# Patient Record
Sex: MALE | Race: WHITE | NOT HISPANIC OR LATINO | Employment: FULL TIME | URBAN - METROPOLITAN AREA
[De-identification: names, ages, dates, MRNs, and addresses within clinical notes are randomized per-mention and may not be internally consistent; named-entity substitution may affect disease eponyms.]

---
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
## 2018-06-26 ENCOUNTER — HOSPITAL ENCOUNTER (EMERGENCY)
Dept: HOSPITAL 8 - ED | Age: 18
Discharge: HOME | End: 2018-06-26
Payer: COMMERCIAL

## 2018-06-26 VITALS — SYSTOLIC BLOOD PRESSURE: 108 MMHG | DIASTOLIC BLOOD PRESSURE: 76 MMHG

## 2018-06-26 VITALS — WEIGHT: 132.94 LBS | HEIGHT: 71 IN | BODY MASS INDEX: 18.61 KG/M2

## 2018-06-26 DIAGNOSIS — J93.83: Primary | ICD-10-CM

## 2018-06-26 DIAGNOSIS — R07.89: ICD-10-CM

## 2018-06-26 PROCEDURE — 99284 EMERGENCY DEPT VISIT MOD MDM: CPT

## 2018-06-26 PROCEDURE — 71046 X-RAY EXAM CHEST 2 VIEWS: CPT

## 2018-06-26 PROCEDURE — 93005 ELECTROCARDIOGRAM TRACING: CPT

## 2018-06-26 PROCEDURE — 96374 THER/PROPH/DIAG INJ IV PUSH: CPT

## 2018-06-26 PROCEDURE — 96375 TX/PRO/DX INJ NEW DRUG ADDON: CPT

## 2018-06-29 ENCOUNTER — HOSPITAL ENCOUNTER (EMERGENCY)
Dept: HOSPITAL 8 - ED | Age: 18
Discharge: HOME | End: 2018-06-29
Payer: COMMERCIAL

## 2018-06-29 VITALS — HEIGHT: 71 IN | BODY MASS INDEX: 18.64 KG/M2 | WEIGHT: 133.16 LBS

## 2018-06-29 VITALS — SYSTOLIC BLOOD PRESSURE: 107 MMHG | DIASTOLIC BLOOD PRESSURE: 65 MMHG

## 2018-06-29 DIAGNOSIS — J93.83: Primary | ICD-10-CM

## 2018-06-29 PROCEDURE — 71046 X-RAY EXAM CHEST 2 VIEWS: CPT

## 2018-06-29 PROCEDURE — 99284 EMERGENCY DEPT VISIT MOD MDM: CPT

## 2018-07-24 ENCOUNTER — NON-PROVIDER VISIT (OUTPATIENT)
Dept: OCCUPATIONAL MEDICINE | Facility: CLINIC | Age: 18
End: 2018-07-24

## 2018-07-24 DIAGNOSIS — Z02.89 VISIT FOR OCCUPATIONAL HEALTH EXAMINATION: Primary | ICD-10-CM

## 2018-07-24 LAB
AMP AMPHETAMINE: NORMAL
BREATH ALCOHOL COMMENT: NORMAL
COC COCAINE: NORMAL
INT CON NEG: NORMAL
INT CON POS: NORMAL
MET METHAMPHETAMINES: NORMAL
OPI OPIATES: NORMAL
PCP PHENCYCLIDINE: NORMAL
POC BREATHALIZER: 0 PERCENT (ref 0–0.01)
POC DRUG COMMENT 753798-OCCUPATIONAL HEALTH: NORMAL
THC: NORMAL

## 2018-07-24 PROCEDURE — 82075 ASSAY OF BREATH ETHANOL: CPT | Performed by: PREVENTIVE MEDICINE

## 2018-07-24 PROCEDURE — 80305 DRUG TEST PRSMV DIR OPT OBS: CPT | Performed by: PREVENTIVE MEDICINE

## 2018-07-31 ENCOUNTER — NON-PROVIDER VISIT (OUTPATIENT)
Dept: OCCUPATIONAL MEDICINE | Facility: CLINIC | Age: 18
End: 2018-07-31
Payer: COMMERCIAL

## 2018-07-31 PROCEDURE — 8911 PR MRO FEE: Performed by: INTERNAL MEDICINE

## 2018-08-10 ENCOUNTER — HOSPITAL ENCOUNTER (OUTPATIENT)
Dept: HOSPITAL 8 - STAR | Age: 18
Discharge: HOME | End: 2018-08-10
Attending: SURGERY
Payer: COMMERCIAL

## 2018-08-10 DIAGNOSIS — Z02.9: Primary | ICD-10-CM

## 2018-08-15 ENCOUNTER — HOSPITAL ENCOUNTER (INPATIENT)
Dept: HOSPITAL 8 - ORIP | Age: 18
LOS: 1 days | Discharge: HOME | DRG: 165 | End: 2018-08-16
Attending: SURGERY | Admitting: SURGERY
Payer: COMMERCIAL

## 2018-08-15 VITALS — DIASTOLIC BLOOD PRESSURE: 56 MMHG | SYSTOLIC BLOOD PRESSURE: 94 MMHG

## 2018-08-15 VITALS — BODY MASS INDEX: 17.5 KG/M2 | WEIGHT: 125 LBS | HEIGHT: 71 IN

## 2018-08-15 DIAGNOSIS — J93.83: Primary | ICD-10-CM

## 2018-08-15 PROCEDURE — 88309 TISSUE EXAM BY PATHOLOGIST: CPT

## 2018-08-15 PROCEDURE — 36415 COLL VENOUS BLD VENIPUNCTURE: CPT

## 2018-08-15 PROCEDURE — 80048 BASIC METABOLIC PNL TOTAL CA: CPT

## 2018-08-15 PROCEDURE — S0028 INJECTION, FAMOTIDINE, 20 MG: HCPCS

## 2018-08-15 PROCEDURE — 86850 RBC ANTIBODY SCREEN: CPT

## 2018-08-15 PROCEDURE — 71045 X-RAY EXAM CHEST 1 VIEW: CPT

## 2018-08-15 PROCEDURE — C1729 CATH, DRAINAGE: HCPCS

## 2018-08-15 PROCEDURE — 86900 BLOOD TYPING SEROLOGIC ABO: CPT

## 2018-08-15 PROCEDURE — 0BBK4ZZ EXCISION OF RIGHT LUNG, PERCUTANEOUS ENDOSCOPIC APPROACH: ICD-10-PCS | Performed by: SURGERY

## 2018-08-15 PROCEDURE — 85025 COMPLETE CBC W/AUTO DIFF WBC: CPT

## 2018-08-15 RX ADMIN — HYDROMORPHONE HYDROCHLORIDE PRN MG: 1 INJECTION, SOLUTION INTRAMUSCULAR; INTRAVENOUS; SUBCUTANEOUS at 14:05

## 2018-08-15 RX ADMIN — OXYCODONE HYDROCHLORIDE PRN MG: 5 SOLUTION ORAL at 13:00

## 2018-08-15 RX ADMIN — HYDROMORPHONE HYDROCHLORIDE PRN MG: 1 INJECTION, SOLUTION INTRAMUSCULAR; INTRAVENOUS; SUBCUTANEOUS at 13:37

## 2018-08-15 RX ADMIN — KETOROLAC TROMETHAMINE SCH MG: 30 INJECTION, SOLUTION INTRAMUSCULAR at 16:30

## 2018-08-15 RX ADMIN — OXYCODONE HYDROCHLORIDE PRN MG: 5 SOLUTION ORAL at 12:57

## 2018-08-15 RX ADMIN — KETOROLAC TROMETHAMINE SCH MG: 30 INJECTION, SOLUTION INTRAMUSCULAR at 23:04

## 2018-08-15 RX ADMIN — FENTANYL CITRATE PRN MCG: 50 INJECTION INTRAMUSCULAR; INTRAVENOUS at 13:09

## 2018-08-15 RX ADMIN — FENTANYL CITRATE PRN MCG: 50 INJECTION INTRAMUSCULAR; INTRAVENOUS at 12:57

## 2018-08-15 RX ADMIN — FAMOTIDINE SCH MG: 10 INJECTION INTRAVENOUS at 15:16

## 2018-08-15 RX ADMIN — HYDROMORPHONE HYDROCHLORIDE PRN MG: 1 INJECTION, SOLUTION INTRAMUSCULAR; INTRAVENOUS; SUBCUTANEOUS at 13:49

## 2018-08-15 RX ADMIN — FENTANYL CITRATE PRN MCG: 50 INJECTION INTRAMUSCULAR; INTRAVENOUS at 14:20

## 2018-08-15 RX ADMIN — FAMOTIDINE SCH MG: 20 TABLET, FILM COATED ORAL at 15:16

## 2018-08-16 VITALS — DIASTOLIC BLOOD PRESSURE: 50 MMHG | SYSTOLIC BLOOD PRESSURE: 95 MMHG

## 2018-08-16 VITALS — SYSTOLIC BLOOD PRESSURE: 98 MMHG | DIASTOLIC BLOOD PRESSURE: 55 MMHG

## 2018-08-16 VITALS — SYSTOLIC BLOOD PRESSURE: 108 MMHG | DIASTOLIC BLOOD PRESSURE: 59 MMHG

## 2018-08-16 LAB
ANION GAP SERPL CALC-SCNC: 5 MMOL/L (ref 5–15)
BASOPHILS # BLD AUTO: 0.02 X10^3/UL (ref 0–0.3)
BASOPHILS NFR BLD AUTO: 0 % (ref 0–1)
CALCIUM SERPL-MCNC: 8.4 MG/DL (ref 8.5–10.1)
CHLORIDE SERPL-SCNC: 106 MMOL/L (ref 98–107)
CREAT SERPL-MCNC: 1.07 MG/DL (ref 0.7–1.3)
EOSINOPHIL # BLD AUTO: 0.06 X10^3/UL (ref 0–0.8)
EOSINOPHIL NFR BLD AUTO: 1 % (ref 1–7)
ERYTHROCYTE [DISTWIDTH] IN BLOOD BY AUTOMATED COUNT: 12.9 % (ref 9.4–14.8)
LYMPHOCYTES # BLD AUTO: 2.2 X10^3/UL (ref 1–6.1)
LYMPHOCYTES NFR BLD AUTO: 20 % (ref 22–44)
MCH RBC QN AUTO: 32.4 PG (ref 27.5–34.5)
MCHC RBC AUTO-ENTMCNC: 34.1 G/DL (ref 33.2–36.2)
MCV RBC AUTO: 94.8 FL (ref 81–97)
MD: NO
MONOCYTES # BLD AUTO: 0.7 X10^3/UL (ref 0–1.4)
MONOCYTES NFR BLD AUTO: 6 % (ref 2–9)
NEUTROPHILS # BLD AUTO: 7.94 X10^3/UL (ref 1.8–8)
NEUTROPHILS NFR BLD AUTO: 73 % (ref 42–75)
PLATELET # BLD AUTO: 311 X10^3/UL (ref 130–400)
PMV BLD AUTO: 7.7 FL (ref 7.4–10.4)
RBC # BLD AUTO: 4.18 X10^6/UL (ref 4.38–5.82)

## 2018-08-16 RX ADMIN — KETOROLAC TROMETHAMINE SCH MG: 30 INJECTION, SOLUTION INTRAMUSCULAR at 05:50

## 2018-08-16 RX ADMIN — FAMOTIDINE SCH MG: 20 TABLET, FILM COATED ORAL at 00:26

## 2018-08-16 RX ADMIN — FAMOTIDINE SCH MG: 10 INJECTION INTRAVENOUS at 00:27

## 2018-08-22 ENCOUNTER — NON-PROVIDER VISIT (OUTPATIENT)
Dept: OCCUPATIONAL MEDICINE | Facility: CLINIC | Age: 18
End: 2018-08-22

## 2018-08-22 DIAGNOSIS — Z02.1 PRE-EMPLOYMENT DRUG SCREENING: ICD-10-CM

## 2018-08-22 LAB
AMP AMPHETAMINE: NORMAL
COC COCAINE: NORMAL
INT CON NEG: NORMAL
INT CON POS: NORMAL
MET METHAMPHETAMINES: NORMAL
OPI OPIATES: NORMAL
PCP PHENCYCLIDINE: NORMAL
POC DRUG COMMENT 753798-OCCUPATIONAL HEALTH: NORMAL
THC: NORMAL

## 2018-08-22 PROCEDURE — 80305 DRUG TEST PRSMV DIR OPT OBS: CPT | Performed by: PREVENTIVE MEDICINE

## 2020-02-20 ENCOUNTER — HOSPITAL ENCOUNTER (OUTPATIENT)
Dept: LAB | Facility: MEDICAL CENTER | Age: 20
End: 2020-02-20
Attending: NURSE PRACTITIONER
Payer: COMMERCIAL

## 2020-02-20 ENCOUNTER — APPOINTMENT (OUTPATIENT)
Dept: RADIOLOGY | Facility: IMAGING CENTER | Age: 20
End: 2020-02-20
Attending: NURSE PRACTITIONER
Payer: COMMERCIAL

## 2020-02-20 ENCOUNTER — OFFICE VISIT (OUTPATIENT)
Dept: URGENT CARE | Facility: CLINIC | Age: 20
End: 2020-02-20
Payer: COMMERCIAL

## 2020-02-20 VITALS
BODY MASS INDEX: 19.6 KG/M2 | DIASTOLIC BLOOD PRESSURE: 78 MMHG | OXYGEN SATURATION: 98 % | WEIGHT: 140 LBS | HEART RATE: 78 BPM | HEIGHT: 71 IN | TEMPERATURE: 98.7 F | SYSTOLIC BLOOD PRESSURE: 110 MMHG

## 2020-02-20 DIAGNOSIS — R06.02 SHORTNESS OF BREATH: ICD-10-CM

## 2020-02-20 DIAGNOSIS — J22 LOWER RESPIRATORY INFECTION (E.G., BRONCHITIS, PNEUMONIA, PNEUMONITIS, PULMONITIS): ICD-10-CM

## 2020-02-20 LAB — D DIMER PPP IA.FEU-MCNC: 0.48 UG/ML (FEU) (ref 0–0.5)

## 2020-02-20 PROCEDURE — 99214 OFFICE O/P EST MOD 30 MIN: CPT | Performed by: NURSE PRACTITIONER

## 2020-02-20 PROCEDURE — 85379 FIBRIN DEGRADATION QUANT: CPT

## 2020-02-20 PROCEDURE — 71046 X-RAY EXAM CHEST 2 VIEWS: CPT | Mod: TC | Performed by: NURSE PRACTITIONER

## 2020-02-20 PROCEDURE — 36415 COLL VENOUS BLD VENIPUNCTURE: CPT

## 2020-02-20 RX ORDER — BENZONATATE 100 MG/1
100 CAPSULE ORAL 3 TIMES DAILY PRN
Qty: 60 CAP | Refills: 0 | Status: SHIPPED | OUTPATIENT
Start: 2020-02-20 | End: 2023-10-05

## 2020-02-20 SDOH — HEALTH STABILITY: MENTAL HEALTH: HOW OFTEN DO YOU HAVE A DRINK CONTAINING ALCOHOL?: 2-3 TIMES A WEEK

## 2020-02-20 ASSESSMENT — ENCOUNTER SYMPTOMS
WHEEZING: 0
SHORTNESS OF BREATH: 1

## 2020-02-20 NOTE — PATIENT INSTRUCTIONS
Cough, Adult  Coughing is a reflex that clears your throat and your airways. Coughing helps to heal and protect your lungs. It is normal to cough occasionally, but a cough that happens with other symptoms or lasts a long time may be a sign of a condition that needs treatment. A cough may last only 2-3 weeks (acute), or it may last longer than 8 weeks (chronic).  What are the causes?  Coughing is commonly caused by:  · Breathing in substances that irritate your lungs.  · A viral or bacterial respiratory infection.  · Allergies.  · Asthma.  · Postnasal drip.  · Smoking.  · Acid backing up from the stomach into the esophagus (gastroesophageal reflux).  · Certain medicines.  · Chronic lung problems, including COPD (or rarely, lung cancer).  · Other medical conditions such as heart failure.  Follow these instructions at home:  Pay attention to any changes in your symptoms. Take these actions to help with your discomfort:  · Take medicines only as told by your health care provider.  ¨ If you were prescribed an antibiotic medicine, take it as told by your health care provider. Do not stop taking the antibiotic even if you start to feel better.  ¨ Talk with your health care provider before you take a cough suppressant medicine.  · Drink enough fluid to keep your urine clear or pale yellow.  · If the air is dry, use a cold steam vaporizer or humidifier in your bedroom or your home to help loosen secretions.  · Avoid anything that causes you to cough at work or at home.  · If your cough is worse at night, try sleeping in a semi-upright position.  · Avoid cigarette smoke. If you smoke, quit smoking. If you need help quitting, ask your health care provider.  · Avoid caffeine.  · Avoid alcohol.  · Rest as needed.  Contact a health care provider if:  · You have new symptoms.  · You cough up pus.  · Your cough does not get better after 2-3 weeks, or your cough gets worse.  · You cannot control your cough with suppressant medicines  and you are losing sleep.  · You develop pain that is getting worse or pain that is not controlled with pain medicines.  · You have a fever.  · You have unexplained weight loss.  · You have night sweats.  Get help right away if:  · You cough up blood.  · You have difficulty breathing.  · Your heartbeat is very fast.  This information is not intended to replace advice given to you by your health care provider. Make sure you discuss any questions you have with your health care provider.  Document Released: 06/15/2012 Document Revised: 05/25/2017 Document Reviewed: 02/24/2016  [employer] Interactive Patient Education © 2017 [employer]    Shortness of Breath, Adult  Shortness of breath is when a person has trouble breathing enough air, or when a person feels like she or he is having trouble breathing in enough air. Shortness of breath could be a sign of medical problem.  Follow these instructions at home:  Pay attention to any changes in your symptoms. Take these actions to help with your condition:  · Do not smoke. Smoking is a common cause of shortness of breath. If you smoke and you need help quitting, ask your health care provider.  · Avoid things that can irritate your airways, such as:  ¨ Mold.  ¨ Dust.  ¨ Air pollution.  ¨ Chemical fumes.  ¨ Things that can cause allergy symptoms (allergens), if you have allergies.  · Keep your living space clean and free of mold and dust.  · Rest as needed. Slowly return to your usual activities.  · Take over-the-counter and prescription medicines, including oxygen and inhaled medicines, only as told by your health care provider.  · Keep all follow-up visits as told by your health care provider. This is important.  Contact a health care provider if:  · Your condition does not improve as soon as expected.  · You have a hard time doing your normal activities, even after you rest.  · You have new symptoms.  Get help right away if:  · Your shortness of breath gets worse.  · You  have shortness of breath when you are resting.  · You feel light-headed or you faint.  · You have a cough that is not controlled with medicines.  · You cough up blood.  · You have pain with breathing.  · You have pain in your chest, arms, shoulders, or abdomen.  · You have a fever.  · You cannot walk up stairs or exercise the way that you normally do.  This information is not intended to replace advice given to you by your health care provider. Make sure you discuss any questions you have with your health care provider.  Document Released: 09/12/2002 Document Revised: 07/08/2017 Document Reviewed: 05/25/2017  [employer] Interactive Patient Education © 2017 [employer]

## 2020-02-20 NOTE — PROGRESS NOTES
Subjective:     Jac Marroquin is a 19 y.o. male who presents for Shortness of Breath (Lung pain for a couple weeks.  Pt has an accute pneumothorax last year and is concerned that he is having problems again)      Bilateral mid chest soreness, occasional sharp pains intermittently. SOB, more than normal. Not enough force with exhaling. Just sore right now, no pain. No fever. Nasal congestion with cough for a couple of weeks.  Non-prod cough, flem. No recent sx. Hx of pneumothorax in 2015, then in 2018 had surgery to close the hole. No issues, until a couple weeks ago.    Shortness of Breath   This is a new problem. The current episode started 1 to 4 weeks ago. The problem occurs intermittently. Pertinent negatives include no ear pain, fever, hemoptysis, leg swelling, sore throat, sputum production or wheezing. Nothing aggravates the symptoms. He has tried nothing for the symptoms. There is no history of asthma, DVT, PE or pneumonia.       History reviewed. No pertinent past medical history.    History reviewed. No pertinent surgical history.    Social History     Socioeconomic History   • Marital status: Single     Spouse name: Not on file   • Number of children: Not on file   • Years of education: Not on file   • Highest education level: Not on file   Occupational History   • Not on file   Social Needs   • Financial resource strain: Not on file   • Food insecurity     Worry: Not on file     Inability: Not on file   • Transportation needs     Medical: Not on file     Non-medical: Not on file   Tobacco Use   • Smoking status: Current Some Day Smoker   • Smokeless tobacco: Never Used   Substance and Sexual Activity   • Alcohol use: Yes     Frequency: 2-3 times a week   • Drug use: Yes     Types: Marijuana, Oral   • Sexual activity: Not Currently   Lifestyle   • Physical activity     Days per week: Not on file     Minutes per session: Not on file   • Stress: Not on file   Relationships   • Social connections     Talks  "on phone: Not on file     Gets together: Not on file     Attends Alevism service: Not on file     Active member of club or organization: Not on file     Attends meetings of clubs or organizations: Not on file     Relationship status: Not on file   • Intimate partner violence     Fear of current or ex partner: Not on file     Emotionally abused: Not on file     Physically abused: Not on file     Forced sexual activity: Not on file   Other Topics Concern   • Not on file   Social History Narrative   • Not on file        History reviewed. No pertinent family history.     No Known Allergies    Review of Systems   Constitutional: Negative for fever.   HENT: Positive for congestion. Negative for ear pain and sore throat.    Respiratory: Positive for cough and shortness of breath. Negative for hemoptysis, sputum production and wheezing.    Cardiovascular: Negative for leg swelling.   All other systems reviewed and are negative.       Objective:   /78   Pulse 78   Temp 37.1 °C (98.7 °F)   Ht 1.803 m (5' 11\")   Wt 63.5 kg (140 lb)   SpO2 98%   BMI 19.53 kg/m²     Physical Exam  Vitals signs reviewed.   Constitutional:       General: He is not in acute distress.     Appearance: He is well-developed.   HENT:      Head: Normocephalic and atraumatic.      Right Ear: Tympanic membrane, ear canal and external ear normal.      Left Ear: Tympanic membrane, ear canal and external ear normal.      Nose: Congestion present.      Right Sinus: No maxillary sinus tenderness or frontal sinus tenderness.      Left Sinus: No maxillary sinus tenderness or frontal sinus tenderness.      Mouth/Throat:      Lips: Pink.      Mouth: Mucous membranes are moist.      Pharynx: Oropharynx is clear. Uvula midline. No posterior oropharyngeal erythema.   Eyes:      Conjunctiva/sclera: Conjunctivae normal.   Neck:      Musculoskeletal: Normal range of motion.   Cardiovascular:      Rate and Rhythm: Normal rate.   Pulmonary:      Effort: " Pulmonary effort is normal. No accessory muscle usage, respiratory distress or retractions.      Breath sounds: Normal breath sounds. No stridor or decreased air movement. No decreased breath sounds, wheezing, rhonchi or rales.   Musculoskeletal: Normal range of motion.   Lymphadenopathy:      Head:      Right side of head: No submental, submandibular, tonsillar, preauricular, posterior auricular or occipital adenopathy.      Left side of head: No submental, submandibular, tonsillar, preauricular, posterior auricular or occipital adenopathy.   Skin:     General: Skin is warm and dry.      Findings: No rash.   Neurological:      General: No focal deficit present.      Mental Status: He is alert and oriented to person, place, and time.      GCS: GCS eye subscore is 4. GCS verbal subscore is 5. GCS motor subscore is 6.   Psychiatric:         Mood and Affect: Mood normal.         Speech: Speech normal.         Behavior: Behavior normal.         Thought Content: Thought content normal.         Judgment: Judgment normal.         Assessment/Plan:   1. Lower respiratory infection (e.g., bronchitis, pneumonia, pneumonitis, pulmonitis)  - DX-CHEST-2 VIEWS; Future  - benzonatate (TESSALON) 100 MG Cap; Take 1 Cap by mouth 3 times a day as needed for Cough.  Dispense: 60 Cap; Refill: 0  - REFERRAL TO FOLLOW-UP WITH PRIMARY CARE  - azithromycin (ZITHROMAX) 250 MG Tab; Take 500 mg day one, 250 mg days 2-5.  Dispense: 6 Tab; Refill: 0  - albuterol 108 (90 Base) MCG/ACT Aero Soln inhalation aerosol; Inhale 2 Puffs by mouth every 6 hours as needed for Shortness of Breath.  Dispense: 8.5 g; Refill: 0    2. Shortness of breath  - DX-CHEST-2 VIEWS; Future  - D-DIMER; Future  - REFERRAL TO FOLLOW-UP WITH PRIMARY CARE  1.  Postoperative change at the RIGHT lung apex.  2.  No acute cardiopulmonary disease.  Wells Criteria: 0, Low probability. No PERC criteria.    Symptomatic care.  -Oral hydration and rest.   -Cough control:  nonpharmacologic options for cough relief such as throat lozenges, hot tea, honey.  -Over the counter expectorant as directed; Guaifenesin (Mucinex).  -Tylenol or ibuprofen for pain and fever as directed.   -Albuterol inhaler as directed.    Follow up with PCP. Follow up for increased or persistent shortness of breath or wheezing, fevers, elevated heart rate, weakness, prolonged cough, chest pain, or any other concerns.    Differential diagnosis, natural history, supportive care, and indications for immediate follow-up discussed.

## 2020-02-21 ENCOUNTER — TELEPHONE (OUTPATIENT)
Dept: URGENT CARE | Facility: PHYSICIAN GROUP | Age: 20
End: 2020-02-21

## 2020-02-21 RX ORDER — AZITHROMYCIN 250 MG/1
TABLET, FILM COATED ORAL
Qty: 6 TAB | Refills: 0 | Status: SHIPPED | OUTPATIENT
Start: 2020-02-21 | End: 2023-10-05

## 2020-02-21 RX ORDER — ALBUTEROL SULFATE 90 UG/1
2 AEROSOL, METERED RESPIRATORY (INHALATION) EVERY 6 HOURS PRN
Qty: 8.5 G | Refills: 0 | Status: SHIPPED | OUTPATIENT
Start: 2020-02-21 | End: 2023-10-05

## 2020-02-24 ASSESSMENT — ENCOUNTER SYMPTOMS
HEMOPTYSIS: 0
FEVER: 0
COUGH: 1
SPUTUM PRODUCTION: 0
SORE THROAT: 0

## 2020-10-22 ENCOUNTER — APPOINTMENT (OUTPATIENT)
Dept: RADIOLOGY | Facility: IMAGING CENTER | Age: 20
End: 2020-10-22
Attending: STUDENT IN AN ORGANIZED HEALTH CARE EDUCATION/TRAINING PROGRAM
Payer: COMMERCIAL

## 2020-10-22 ENCOUNTER — HOSPITAL ENCOUNTER (OUTPATIENT)
Facility: MEDICAL CENTER | Age: 20
End: 2020-10-22
Attending: STUDENT IN AN ORGANIZED HEALTH CARE EDUCATION/TRAINING PROGRAM
Payer: COMMERCIAL

## 2020-10-22 ENCOUNTER — OFFICE VISIT (OUTPATIENT)
Dept: URGENT CARE | Facility: CLINIC | Age: 20
End: 2020-10-22
Payer: COMMERCIAL

## 2020-10-22 VITALS
DIASTOLIC BLOOD PRESSURE: 54 MMHG | HEIGHT: 71 IN | OXYGEN SATURATION: 98 % | RESPIRATION RATE: 16 BRPM | TEMPERATURE: 98.4 F | SYSTOLIC BLOOD PRESSURE: 104 MMHG | BODY MASS INDEX: 18.2 KG/M2 | WEIGHT: 130 LBS | HEART RATE: 66 BPM

## 2020-10-22 DIAGNOSIS — R06.02 SHORTNESS OF BREATH: ICD-10-CM

## 2020-10-22 DIAGNOSIS — R07.89 OTHER CHEST PAIN: ICD-10-CM

## 2020-10-22 DIAGNOSIS — J06.9 VIRAL URI WITH COUGH: ICD-10-CM

## 2020-10-22 PROCEDURE — U0003 INFECTIOUS AGENT DETECTION BY NUCLEIC ACID (DNA OR RNA); SEVERE ACUTE RESPIRATORY SYNDROME CORONAVIRUS 2 (SARS-COV-2) (CORONAVIRUS DISEASE [COVID-19]), AMPLIFIED PROBE TECHNIQUE, MAKING USE OF HIGH THROUGHPUT TECHNOLOGIES AS DESCRIBED BY CMS-2020-01-R: HCPCS

## 2020-10-22 PROCEDURE — 99214 OFFICE O/P EST MOD 30 MIN: CPT | Performed by: STUDENT IN AN ORGANIZED HEALTH CARE EDUCATION/TRAINING PROGRAM

## 2020-10-22 PROCEDURE — 71046 X-RAY EXAM CHEST 2 VIEWS: CPT | Mod: TC,FY | Performed by: STUDENT IN AN ORGANIZED HEALTH CARE EDUCATION/TRAINING PROGRAM

## 2020-10-22 ASSESSMENT — PAIN SCALES - GENERAL: PAINLEVEL: 6=MODERATE PAIN

## 2020-10-22 NOTE — PROGRESS NOTES
"Subjective:   CHIEF COMPLAINT  Chief Complaint   Patient presents with   • Chest Pain     x2 days    • Shortness of Breath     just yesterday    • Cough     just this morning        HPI  Jac Marroquin is a 20 y.o. male with a history of spontaneous pneumothorax x2, last was approximately 2 years ago status post VATS procedure presents with chief complaint of chest pain and shortness of breath.  Patient reports he developed sharp right-sided chest pain and shortness of breath yesterday around 1 PM while at work.  The patient is a .  Said he was changing tires at the time.  He then went to lunch and symptoms improved.  This morning the patient reports symptoms returned when he woke up.  Described the pain as sharp right-sided pain and left-sided soreness.  The symptoms do not radiate.  Symptoms are worse with positional changes and deep breaths.  Symptoms are not aggravated with hard physical exertion; says he does not experience the pain on changing tires at work or when walking from the car into the clinic.  The sharp pain lasts a \"split second\".  Ibuprofen helps.  Patient does not use tobacco.  No calf pain.  He admits associated symptoms of a dry cough and sore throat.      REVIEW OF SYSTEMS  General: no fever or chills  GI: no nausea or vomiting  See HPI for further details.     PAST MEDICAL HISTORY       SURGICAL HISTORY  patient denies any surgical history    ALLERGIES  No Known Allergies    CURRENT MEDICATIONS  Home Medications     Reviewed by Danilo Christian'maurice (Medical Assistant) on 10/22/20 at 1420  Med List Status: <None>   Medication Last Dose Status   albuterol 108 (90 Base) MCG/ACT Aero Soln inhalation aerosol PRN Active   azithromycin (ZITHROMAX) 250 MG Tab Not Taking Active   bacitracin-polymyxin b (POLYSPORIN) 500-14770 UNIT/GM Ointment Not Taking Active   benzonatate (TESSALON) 100 MG Cap Not Taking Active                SOCIAL HISTORY  Social History     Tobacco Use   • Smoking " "status: Never Smoker   • Smokeless tobacco: Current User     Types: Chew   Substance and Sexual Activity   • Alcohol use: Yes     Frequency: 2-3 times a week   • Drug use: Yes     Types: Marijuana, Oral   • Sexual activity: Not Currently       FAMILY HISTORY  No family history on file.       Objective:   PHYSICAL EXAM  VITAL SIGNS: /54   Pulse 66   Temp 36.9 °C (98.4 °F) (Temporal)   Resp 16   Ht 1.803 m (5' 11\")   Wt 59 kg (130 lb)   SpO2 98%   BMI 18.13 kg/m²     Gen: no acute distress, normal voice  Skin: dry, intact, moist mucosal membranes  ENT: TMs clear and intact bilaterally without presence of middle ear effusion.  Presence  Scattered pharyngeal erythema w/o exudates.   Lungs: CTAB w/ symmetric expansion  CV: RRR w/o murmurs or clicks  Abdomen: Bowel sounds normal, Soft, No tenderness, No masses, No pulsatile masses.   Psych: normal affect, normal judgement, alert, awake    Chest x-ray: Negative for any acute cardiopulmonary process per radiology.  Personally reviewed and discussed the x-rays with the patient    Assessment/Plan:     1. Other chest pain  DX-CHEST-2 VIEWS    COVID/SARS COV-2 PCR   2. Shortness of breath  DX-CHEST-2 VIEWS    COVID/SARS COV-2 PCR   3. Viral URI with cough     Suspect symptoms are secondary to chronic pleurisy s/p pneumothorax x2 following VATS procedure approximately 2 years prior, with acute exacerbation due to viral URI.  Vital signs and chest x-ray were unremarkable.  -ordered COVID  -Referred to primary care for more long-term management of symptoms and further work-up as indicated  -Directed to use ibuprofen 800 mg 3 times daily as needed chest pain  -Pt was in full understanding and agreement with the plan.    Differential diagnosis, natural history, supportive care, and indications for immediate follow-up discussed. All questions answered. Patient agrees with the plan of care.  Follow-up as needed if symptoms worsen or fail to improve to PCP, Urgent care or " Emergency Room.       Update on 10/24/2020: Contacted patient and spoke over the phone informing him he tested negative for Covid

## 2020-10-22 NOTE — LETTER
October 22, 2020        Jac Marroquin  [address] [id number]      To Whom it May Concern,   Your employee was seen in our clinic today.  A concern for COVID-19 has been identified and testing is in progress.?     We are asking you to excuse absences while following self-isolation protocol per Center for Disease Control (CDC) guidelines.  Your employee will be able to access test results through our electronic delivery system called [employer].?     If the results of testing are negative, and once there has been no fever (temperature >100.4 F) for at least 72 hours without treatment, and no vomiting or diarrhea for at least 48 hours, then return to work is approved.     If the results of testing are positive then your employee will be contacted by the [facility] or [facility] department for further instructions on duration of self-isolation and return to work protocol. In general, this will also follow the CDC guidelines with a minimum of 10 days from the onset of symptoms and without fever, vomiting, or diarrhea as above.?     In general, repeat testing is not necessary and not offered through our [facility] care.?     This is the only note that will be provided from [facility] for this visit.  Your employee will require an appointment with a primary care provider if FMLA or disability forms are required.     Sincerely,            Francis Gaytan D.O.    Electronically Signed

## 2020-10-22 NOTE — NON-PROVIDER
Had surgery two years ago and hx of acute pneumothorax #2 in the last 4 yrs. Was working yesterday, had a very sharp pain in the same place he had sx. Now he feels SOB and has a cough this morning.

## 2020-10-23 DIAGNOSIS — R06.02 SHORTNESS OF BREATH: ICD-10-CM

## 2020-10-23 DIAGNOSIS — R07.89 OTHER CHEST PAIN: ICD-10-CM

## 2020-10-23 LAB
COVID ORDER STATUS COVID19: NORMAL
SARS-COV-2 RNA RESP QL NAA+PROBE: NOTDETECTED
SPECIMEN SOURCE: NORMAL

## 2020-11-13 ENCOUNTER — OFFICE VISIT (OUTPATIENT)
Dept: URGENT CARE | Facility: CLINIC | Age: 20
End: 2020-11-13
Payer: COMMERCIAL

## 2020-11-13 ENCOUNTER — HOSPITAL ENCOUNTER (OUTPATIENT)
Facility: MEDICAL CENTER | Age: 20
End: 2020-11-13
Attending: NURSE PRACTITIONER
Payer: COMMERCIAL

## 2020-11-13 VITALS
DIASTOLIC BLOOD PRESSURE: 64 MMHG | SYSTOLIC BLOOD PRESSURE: 108 MMHG | RESPIRATION RATE: 16 BRPM | WEIGHT: 138 LBS | HEIGHT: 71 IN | HEART RATE: 82 BPM | OXYGEN SATURATION: 96 % | TEMPERATURE: 98.6 F | BODY MASS INDEX: 19.32 KG/M2

## 2020-11-13 DIAGNOSIS — J34.89 STUFFY AND RUNNY NOSE: ICD-10-CM

## 2020-11-13 DIAGNOSIS — R23.3 PALATAL PETECHIAE: ICD-10-CM

## 2020-11-13 DIAGNOSIS — J02.9 PHARYNGITIS, UNSPECIFIED ETIOLOGY: ICD-10-CM

## 2020-11-13 DIAGNOSIS — Z87.09 HISTORY OF STREP SORE THROAT: ICD-10-CM

## 2020-11-13 DIAGNOSIS — R05.9 COUGH: ICD-10-CM

## 2020-11-13 LAB
INT CON NEG: NORMAL
INT CON POS: NORMAL
S PYO AG THROAT QL: NEGATIVE

## 2020-11-13 PROCEDURE — 99214 OFFICE O/P EST MOD 30 MIN: CPT | Performed by: NURSE PRACTITIONER

## 2020-11-13 PROCEDURE — 87880 STREP A ASSAY W/OPTIC: CPT | Performed by: NURSE PRACTITIONER

## 2020-11-13 PROCEDURE — U0003 INFECTIOUS AGENT DETECTION BY NUCLEIC ACID (DNA OR RNA); SEVERE ACUTE RESPIRATORY SYNDROME CORONAVIRUS 2 (SARS-COV-2) (CORONAVIRUS DISEASE [COVID-19]), AMPLIFIED PROBE TECHNIQUE, MAKING USE OF HIGH THROUGHPUT TECHNOLOGIES AS DESCRIBED BY CMS-2020-01-R: HCPCS

## 2020-11-13 RX ORDER — AMOXICILLIN 500 MG/1
1000 CAPSULE ORAL DAILY
Qty: 20 CAP | Refills: 0 | Status: SHIPPED | OUTPATIENT
Start: 2020-11-13 | End: 2020-11-23

## 2020-11-13 ASSESSMENT — ENCOUNTER SYMPTOMS
COUGH: 1
SHORTNESS OF BREATH: 0
CONSTITUTIONAL NEGATIVE: 1
FEVER: 0
SORE THROAT: 1

## 2020-11-13 ASSESSMENT — VISUAL ACUITY: OU: 1

## 2020-11-14 NOTE — PATIENT INSTRUCTIONS
COVID test pending.    The CDC recommends that any person who has symptoms of COVID-19 self-isolates until 1) at least 10 days have elapsed since symptom onset, and 2) at least 24 hours have passed since resolution of any fever without use of fever-reducing medications, and 3) other symptoms have improved.    The CDC recommends that any person who has been exposed to a person with COVID-19 self-isolates for 14 days after exposure.    According to the CDC, the patient may leave self-isolation once all of the applicable criteria have been met.        Pharyngitis    Pharyngitis is redness, pain, and swelling (inflammation) of the throat (pharynx). It is a very common cause of sore throat. Pharyngitis can be caused by a bacteria, but it is usually caused by a virus. Most cases of pharyngitis get better on their own without treatment.  What are the causes?  This condition may be caused by:  · Infection by viruses (viral). Viral pharyngitis spreads from person to person (is contagious) through coughing, sneezing, and sharing of personal items or utensils such as cups, forks, spoons, and toothbrushes.  · Infection by bacteria (bacterial). Bacterial pharyngitis may be spread by touching the nose or face after coming in contact with the bacteria, or through more intimate contact, such as kissing.  · Allergies. Allergies can cause buildup of mucus in the throat (post-nasal drip), leading to inflammation and irritation. Allergies can also cause blocked nasal passages, forcing breathing through the mouth, which dries and irritates the throat.  What increases the risk?  You are more likely to develop this condition if:  · You are 5-24 years old.  · You are exposed to crowded environments such as , school, or dormitory living.  · You live in a cold climate.  · You have a weakened disease-fighting (immune) system.  What are the signs or symptoms?  Symptoms of this condition vary by the cause (viral, bacterial, or allergies)  and can include:  · Sore throat.  · Fatigue.  · Low-grade fever.  · Headache.  · Joint pain and muscle aches.  · Skin rashes.  · Swollen glands in the throat (lymph nodes).  · Plaque-like film on the throat or tonsils. This is often a symptom of bacterial pharyngitis.  · Vomiting.  · Stuffy nose (nasal congestion).  · Cough.  · Red, itchy eyes (conjunctivitis).  · Loss of appetite.  How is this diagnosed?  This condition is often diagnosed based on your medical history and a physical exam. Your health care provider will ask you questions about your illness and your symptoms. A swab of your throat may be done to check for bacteria (rapid strep test). Other lab tests may also be done, depending on the suspected cause, but these are rare.  How is this treated?  This condition usually gets better in 3-4 days without medicine. Bacterial pharyngitis may be treated with antibiotic medicines.  Follow these instructions at home:  · Take over-the-counter and prescription medicines only as told by your health care provider.  ? If you were prescribed an antibiotic medicine, take it as told by your health care provider. Do not stop taking the antibiotic even if you start to feel better.  ? Do not give children aspirin because of the association with Reye syndrome.  · Drink enough water and fluids to keep your urine clear or pale yellow.  · Get a lot of rest.  · Gargle with a salt-water mixture 3-4 times a day or as needed. To make a salt-water mixture, completely dissolve ½-1 tsp of salt in 1 cup of warm water.  · If your health care provider approves, you may use throat lozenges or sprays to soothe your throat.  Contact a health care provider if:  · You have large, tender lumps in your neck.  · You have a rash.  · You cough up green, yellow-brown, or bloody spit.  Get help right away if:  · Your neck becomes stiff.  · You drool or are unable to swallow liquids.  · You cannot drink or take medicines without vomiting.  · You have  severe pain that does not go away, even after you take medicine.  · You have trouble breathing, and it is not caused by a stuffy nose.  · You have new pain and swelling in your joints such as the knees, ankles, wrists, or elbows.  Summary  · Pharyngitis is redness, pain, and swelling (inflammation) of the throat (pharynx).  · While pharyngitis can be caused by a bacteria, the most common causes are viral.  · Most cases of pharyngitis get better on their own without treatment.  · Bacterial pharyngitis is treated with antibiotic medicines.  This information is not intended to replace advice given to you by your health care provider. Make sure you discuss any questions you have with your health care provider.  Document Released: 12/18/2006 Document Revised: 11/30/2018 Document Reviewed: 01/23/2018  [employer] Patient Education © 2020 [employer]

## 2020-11-14 NOTE — PROGRESS NOTES
Subjective:     Jac Marroquin is a 20 y.o. male who presents for Coronavirus Screening (sore throat, runny nose, sinus clogged, cough started 2 days ago)       Pharyngitis   This is a new problem. Episode onset: 2 days ago. The problem has been gradually worsening. Associated symptoms include congestion and coughing. Pertinent negatives include no shortness of breath.     Denies sick contacts or recent travel.  Patient reports a history of strep and is concerned about the same.  Patient reports that his work is concerns of COVID-19.    During this visit, appropriate PPE was worn, hand hygiene was performed, and the patient and any visitors were masked.     PMH:  has no past medical history on file.    MEDS:   Current Outpatient Medications:   •  amoxicillin (AMOXIL) 500 MG Cap, Take 2 Caps by mouth every day for 10 days., Disp: 20 Cap, Rfl: 0  •  azithromycin (ZITHROMAX) 250 MG Tab, Take 500 mg day one, 250 mg days 2-5. (Patient not taking: Reported on 10/22/2020), Disp: 6 Tab, Rfl: 0  •  albuterol 108 (90 Base) MCG/ACT Aero Soln inhalation aerosol, Inhale 2 Puffs by mouth every 6 hours as needed for Shortness of Breath. (Patient not taking: Reported on 11/13/2020), Disp: 8.5 g, Rfl: 0  •  benzonatate (TESSALON) 100 MG Cap, Take 1 Cap by mouth 3 times a day as needed for Cough. (Patient not taking: Reported on 10/22/2020), Disp: 60 Cap, Rfl: 0  •  bacitracin-polymyxin b (POLYSPORIN) 500-25824 UNIT/GM Ointment, Apply  to affected area(s) 3 times a day., Disp: 1 Tube, Rfl: 0    ALLERGIES: No Known Allergies    SURGHX: History reviewed. No pertinent surgical history.    SOCHX:  reports that he has never smoked. His smokeless tobacco use includes chew. He reports current alcohol use. He reports current drug use. Drugs: Marijuana and Oral.     FH: Reviewed with patient, not pertinent to this visit.    Review of Systems   Constitutional: Negative.  Negative for fever and malaise/fatigue.   HENT: Positive for congestion and  "sore throat.    Respiratory: Positive for cough. Negative for shortness of breath.    All other systems reviewed and are negative.    Additional details per HPI.      Objective:     /64   Pulse 82   Temp 37 °C (98.6 °F)   Resp 16   Ht 1.803 m (5' 11\")   Wt 62.6 kg (138 lb)   SpO2 96%   BMI 19.25 kg/m²     Physical Exam  Vitals signs reviewed.   Constitutional:       General: He is not in acute distress.     Appearance: He is well-developed. He is not ill-appearing or toxic-appearing.   HENT:      Head: Normocephalic.      Right Ear: Tympanic membrane and external ear normal.      Left Ear: Tympanic membrane and external ear normal.      Nose: Congestion present.      Mouth/Throat:      Mouth: Mucous membranes are moist.      Pharynx: Uvula midline. Posterior oropharyngeal erythema (Palatal petechiae) present.   Eyes:      General: Vision grossly intact.      Extraocular Movements: Extraocular movements intact.      Conjunctiva/sclera: Conjunctivae normal.   Neck:      Musculoskeletal: Normal range of motion.   Cardiovascular:      Rate and Rhythm: Normal rate and regular rhythm.      Heart sounds: Normal heart sounds.   Pulmonary:      Effort: Pulmonary effort is normal. No respiratory distress.      Breath sounds: Normal breath sounds. No decreased breath sounds, wheezing, rhonchi or rales.   Abdominal:      General: Bowel sounds are normal.   Musculoskeletal: Normal range of motion.         General: No deformity.   Lymphadenopathy:      Cervical: No cervical adenopathy.   Skin:     General: Skin is warm and dry.      Coloration: Skin is not pale.   Neurological:      Mental Status: He is alert and oriented to person, place, and time.      Sensory: No sensory deficit.      Motor: No weakness.      Coordination: Coordination normal.   Psychiatric:         Behavior: Behavior normal. Behavior is cooperative.     Rapid Strep A swab: negative      Assessment/Plan:     1. Pharyngitis, unspecified etiology  - " POCT Rapid Strep A  - amoxicillin (AMOXIL) 500 MG Cap; Take 2 Caps by mouth every day for 10 days.  Dispense: 20 Cap; Refill: 0    2. Palatal petechiae    3. History of strep sore throat    4. Stuffy and runny nose    5. Cough  - COVID/SARS CoV-2 PCR; Future    COVID test pending.    The CDC recommends that any person who has symptoms of COVID-19 self-isolates until 1) at least 10 days have elapsed since symptom onset, and 2) at least 24 hours have passed since resolution of any fever without use of fever-reducing medications, and 3) other symptoms have improved.    The CDC recommends that any person who has been exposed to a person with COVID-19 self-isolates for 14 days after exposure.    According to the CDC, the patient may leave self-isolation once all of the applicable criteria have been met.    Differential diagnosis, natural history, supportive care, rest, fluids, gargles, over-the-counter symptom management per 's instructions, ibuprofen, acetaminophen, close monitoring, and indications for immediate follow-up discussed.     Patient advised to: Return for 1) Symptoms that worsen/don't improve, or go to ER, 2) Follow up with primary care in 7-10 days.    All questions answered. Patient agrees with the plan of care.    Discharge summary provided.     Billing note for MDM: at least a moderate risk due to 1) undiagnosed new problem, and problem points due to 2) new, further work up planned (COVID-19 test pending to further evaluate viral illness).

## 2020-11-15 DIAGNOSIS — R05.9 COUGH: ICD-10-CM

## 2020-11-15 LAB — COVID ORDER STATUS COVID19: NORMAL

## 2020-11-16 LAB
SARS-COV-2 RNA RESP QL NAA+PROBE: NOTDETECTED
SPECIMEN SOURCE: NORMAL

## 2023-10-05 ENCOUNTER — OFFICE VISIT (OUTPATIENT)
Dept: URGENT CARE | Facility: PHYSICIAN GROUP | Age: 23
End: 2023-10-05
Payer: COMMERCIAL

## 2023-10-05 VITALS
HEIGHT: 71 IN | WEIGHT: 146 LBS | OXYGEN SATURATION: 98 % | SYSTOLIC BLOOD PRESSURE: 102 MMHG | BODY MASS INDEX: 20.44 KG/M2 | TEMPERATURE: 98.1 F | RESPIRATION RATE: 12 BRPM | HEART RATE: 84 BPM | DIASTOLIC BLOOD PRESSURE: 56 MMHG

## 2023-10-05 DIAGNOSIS — J06.9 VIRAL URI WITH COUGH: ICD-10-CM

## 2023-10-05 LAB
FLUAV RNA SPEC QL NAA+PROBE: NEGATIVE
FLUBV RNA SPEC QL NAA+PROBE: NEGATIVE
RSV RNA SPEC QL NAA+PROBE: NEGATIVE
S PYO DNA SPEC NAA+PROBE: NOT DETECTED
SARS-COV-2 RNA RESP QL NAA+PROBE: NEGATIVE

## 2023-10-05 PROCEDURE — 87651 STREP A DNA AMP PROBE: CPT | Performed by: NURSE PRACTITIONER

## 2023-10-05 PROCEDURE — 0241U POCT CEPHEID COV-2, FLU A/B, RSV - PCR: CPT | Performed by: NURSE PRACTITIONER

## 2023-10-05 PROCEDURE — 99213 OFFICE O/P EST LOW 20 MIN: CPT | Performed by: NURSE PRACTITIONER

## 2023-10-05 PROCEDURE — 3074F SYST BP LT 130 MM HG: CPT | Performed by: NURSE PRACTITIONER

## 2023-10-05 PROCEDURE — 3078F DIAST BP <80 MM HG: CPT | Performed by: NURSE PRACTITIONER

## 2023-10-05 ASSESSMENT — ENCOUNTER SYMPTOMS
DIARRHEA: 0
COUGH: 1
FEVER: 0
VOMITING: 0
SHORTNESS OF BREATH: 1
WHEEZING: 0
SORE THROAT: 1
TROUBLE SWALLOWING: 0

## 2023-10-05 NOTE — PROGRESS NOTES
Subjective:     Jac Marroquin is a 23 y.o. male who presents for Pharyngitis (Runny nose, cough, allergic reaction on roof of mouth/X 2 days )      Started 2 days ago. Upper chest pain with cough. Has a itchy sensation to the roof of his mouth. No oral swelling or lesions. Generally has spring allergies. Had tried an OTC allergy medication.     Pharyngitis   This is a new problem. The current episode started in the past 7 days. The problem has been gradually worsening. Associated symptoms include congestion, coughing and shortness of breath. Pertinent negatives include no diarrhea, drooling, ear pain, trouble swallowing or vomiting. He has had no exposure to strep.       History reviewed. No pertinent past medical history.    History reviewed. No pertinent surgical history.    Social History     Socioeconomic History    Marital status: Single     Spouse name: Not on file    Number of children: Not on file    Years of education: Not on file    Highest education level: Not on file   Occupational History    Not on file   Tobacco Use    Smoking status: Never    Smokeless tobacco: Current     Types: Chew   Vaping Use    Vaping Use: Former   Substance and Sexual Activity    Alcohol use: Yes    Drug use: Not Currently     Types: Marijuana, Oral    Sexual activity: Not Currently   Other Topics Concern    Not on file   Social History Narrative    Not on file     Social Determinants of Health     Financial Resource Strain: Not on file   Food Insecurity: Not on file   Transportation Needs: Not on file   Physical Activity: Not on file   Stress: Not on file   Social Connections: Not on file   Intimate Partner Violence: Not on file   Housing Stability: Not on file        History reviewed. No pertinent family history.     No Known Allergies    Review of Systems   Constitutional:  Negative for fever.   HENT:  Positive for congestion and sore throat. Negative for drooling, ear pain and trouble swallowing.    Respiratory:  Positive  "for cough and shortness of breath. Negative for wheezing.    Gastrointestinal:  Negative for diarrhea and vomiting.   Endo/Heme/Allergies:  Positive for environmental allergies.   All other systems reviewed and are negative.       Objective:   /56   Pulse 84   Temp 36.7 °C (98.1 °F) (Temporal)   Resp 12   Ht 1.803 m (5' 11\")   Wt 66.2 kg (146 lb)   SpO2 98%   BMI 20.36 kg/m²     Physical Exam  Vitals reviewed.   Constitutional:       General: He is not in acute distress.     Appearance: He is well-developed. He is not toxic-appearing.   HENT:      Head: Normocephalic and atraumatic.      Right Ear: Ear canal and external ear normal. Tympanic membrane is not erythematous.      Left Ear: Ear canal and external ear normal. Tympanic membrane is not erythematous.      Nose: Congestion present.      Comments: Clear secretions.      Mouth/Throat:      Lips: Pink.      Mouth: Mucous membranes are moist.      Pharynx: Uvula midline. Posterior oropharyngeal erythema present. No oropharyngeal exudate.      Tonsils: No tonsillar abscesses.   Eyes:      Conjunctiva/sclera: Conjunctivae normal.   Cardiovascular:      Rate and Rhythm: Normal rate.   Pulmonary:      Effort: Pulmonary effort is normal. No tachypnea, bradypnea, accessory muscle usage, prolonged expiration or respiratory distress.      Breath sounds: Normal breath sounds. No stridor or decreased air movement. No decreased breath sounds, wheezing, rhonchi or rales.   Musculoskeletal:         General: Normal range of motion.      Cervical back: Neck supple.   Skin:     General: Skin is warm and dry.      Findings: No rash.   Neurological:      Mental Status: He is alert and oriented to person, place, and time.      GCS: GCS eye subscore is 4. GCS verbal subscore is 5. GCS motor subscore is 6.   Psychiatric:         Speech: Speech normal.         Behavior: Behavior normal.         Thought Content: Thought content normal.         Judgment: Judgment normal. "         Assessment/Plan:   1. Viral URI with cough  - POCT CEPHEID COV-2, FLU A/B, RSV - PCR  - POCT CEPHEID GROUP A STREP - PCR    Results for orders placed or performed in visit on 10/05/23   POCT CEPHEID COV-2, FLU A/B, RSV - PCR   Result Value Ref Range    SARS-CoV-2 by PCR Negative Negative, Invalid    Influenza virus A RNA Negative Negative, Invalid    Influenza virus B, PCR Negative Negative, Invalid    RSV, PCR Negative Negative, Invalid   POCT CEPHEID GROUP A STREP - PCR   Result Value Ref Range    POC Group A Strep, PCR Not Detected Not Detected, Invalid   Symptomatic care.  -Oral hydration and rest.   -Cough control: nonpharmacologic options for cough relief such as throat lozenges, hot tea, honey.  -Over the counter expectorant as directed; Guaifenesin (Mucinex).  -Tylenol or ibuprofen for pain and fever as directed.   -Warm salt water gargles.  -OTC Throat lozenges or spray (Cepacol).    Seek emergency medical care immediately for: Trouble breathing, persistent pain or pressure in the chest, confusion, inability to wake or stay awake, bluish lips or face, persistent tachycardia (fast heart rate), prolonged dizziness, persistent high grade fevers. Follow up for prolonged cough, persistent wheezing, persistent throat pain, difficulty swallowing, persistent fevers, leg swelling, or any other concerns. Follow up with your Primary Care Provider.     -Discussed viral etiology, symptomatic care. S&S of PNA with follow up. Stable Vitals.    Differential diagnosis, natural history, supportive care, and indications for immediate follow-up discussed.   Secondary Intention Text (Leave Blank If You Do Not Want): The defect will heal with secondary intention.

## 2024-04-11 NOTE — PROGRESS NOTES
Chart Review:  Last Depo given 24 with NEXT INJECTION DUE: 24 - 5/10/24  in left deltoid.  Order: Last ordered 23- new order pended  ++++++++++++++++++++++++++++++++++++++++++++++++++++  Clinic Administered Medication Documentation      Depo Provera Documentation    Depo-Provera Standing Order inclusion/exclusion criteria reviewed.     Is this the initial or subsequent dose of Depo Provera? Subsequent dose - patient is within the acceptable window of time (11-15 weeks) for subsequent injection. Pregnancy test not indicated.    Patient meets: inclusion criteria     Is there an active order (written within the past 365 days, with administrations remaining, not ) in the chart? Yes.     Prior to injection, verified patient identity using patient's name and date of birth. Medication was administered. Please see MAR and medication order for additional information.     Vial/Syringe: Single dose vial. Was entire vial of medication used? Yes    Patient instructed to remain in clinic for 15 minutes and report any adverse reaction to staff immediately but patient declined.  NEXT INJECTION DUE: 24 - 24    Verified that the patient has refills remaining in their prescription.    Provided date range for next Depo-provera injection to patient.  Appointment scheduled:  Future Appointments 4/15/2024 - 10/12/2024        Date Visit Type Length Department Provider     2024  1:15 PM MA VISIT 30 min [facility]SE    Location Instructions:     From [place] - follow [address]nbsp; Take a right at the intersection across from [employer].&nbsp; The clinic will be on your right.  From [place] - follow [address]nbsp; Take a right onto [address].&nbsp; Take a left at the intersection across from [employer].&nbsp; The clinic will be on your right.  From [place] - follow [address]nbsp; Take a left onto [address].&nbsp; Take a left at the intersection across from [employer]  Patient arrived from [place], Patient awake alert but confused doesn't remember what happen. Abrasions to face and laceration to back of head with 4 sutures.  Patient ambulated to bathroom.Mom and Dad at bedside.   [address].&nbsp; The clinic will be on your right.                     Patient reports having some breast tenderness the past few days and is wondering if it is related to her Depo-provera injection.  Will route note to PCP.  Patient advised to follow-up with PCP if it does not clear after receiving injection.      Suha Altamirano RN Care Coordinator  [facility]

## 2024-05-13 ENCOUNTER — OFFICE VISIT (OUTPATIENT)
Dept: URGENT CARE | Facility: CLINIC | Age: 24
End: 2024-05-13
Payer: COMMERCIAL

## 2024-05-13 VITALS
BODY MASS INDEX: 20.47 KG/M2 | TEMPERATURE: 98.1 F | SYSTOLIC BLOOD PRESSURE: 108 MMHG | WEIGHT: 146.2 LBS | DIASTOLIC BLOOD PRESSURE: 60 MMHG | OXYGEN SATURATION: 97 % | RESPIRATION RATE: 16 BRPM | HEIGHT: 71 IN | HEART RATE: 76 BPM

## 2024-05-13 DIAGNOSIS — H10.13 ALLERGIC CONJUNCTIVITIS OF BOTH EYES: ICD-10-CM

## 2024-05-13 PROCEDURE — 99213 OFFICE O/P EST LOW 20 MIN: CPT | Performed by: PHYSICIAN ASSISTANT

## 2024-05-13 PROCEDURE — 3074F SYST BP LT 130 MM HG: CPT | Performed by: PHYSICIAN ASSISTANT

## 2024-05-13 PROCEDURE — 3078F DIAST BP <80 MM HG: CPT | Performed by: PHYSICIAN ASSISTANT

## 2024-05-13 RX ORDER — OLOPATADINE HYDROCHLORIDE 1 MG/ML
1 SOLUTION/ DROPS OPHTHALMIC 2 TIMES DAILY
Qty: 5 ML | Refills: 0 | Status: SHIPPED | OUTPATIENT
Start: 2024-05-13

## 2024-05-13 ASSESSMENT — VISUAL ACUITY: OU: 1

## 2024-05-13 NOTE — PROGRESS NOTES
"Subjective:   Jac Marroquin is a 23 y.o. male who presents for Eye Problem (Bilateral eye redness, discharge, feels like there is gravel in both eyes)       Patient awoke this morning with bilateral eye erythema and grittiness with some matting  No contact lens use  No known fb, inside all weekend playing videogames  Very irritated, mildly itchy  Matted close this morning but has not had copious discharge  H/o seasonal allergies, normally nose running  No recent uri symptoms      Medications:  This patient does not have an active medication from one of the medication groupers.    Allergies:             Patient has no known allergies.    Surgical History:       No past surgical history on file.    Past Social Hx:  Jac Marroquin  reports that he has never smoked. His smokeless tobacco use includes chew. He reports current alcohol use. He reports that he does not currently use drugs after having used the following drugs: Marijuana and Oral.     Past Family Hx:   Jac Marroquin family history is not on file.       Problem list, medications, and allergies reviewed by myself today in Epic.     Objective:     /60 (BP Location: Right arm, Patient Position: Sitting, BP Cuff Size: Adult)   Pulse 76   Temp 36.7 °C (98.1 °F) (Temporal)   Resp 16   Ht 1.803 m (5' 11\")   Wt 66.3 kg (146 lb 3.2 oz)   SpO2 97%   BMI 20.39 kg/m²     Physical Exam  Vitals and nursing note reviewed.   Constitutional:       General: He is not in acute distress.     Appearance: He is well-developed. He is not ill-appearing, toxic-appearing or diaphoretic.      Comments: This is a nontoxic-appearing child in no apparent distress   HENT:      Head: Normocephalic.      Nose: Nose normal.      Mouth/Throat:      Mouth: Mucous membranes are moist.   Eyes:      General: Lids are normal. Vision grossly intact. Gaze aligned appropriately. No visual field deficit or scleral icterus.        Right eye: No discharge.         Left eye: No " discharge.      Extraocular Movements: Extraocular movements intact.      Right eye: Normal extraocular motion.      Left eye: Normal extraocular motion.      Conjunctiva/sclera:      Right eye: Right conjunctiva is injected. No chemosis or hemorrhage.     Left eye: Left conjunctiva is injected. No chemosis or hemorrhage.     Pupils: Pupils are equal, round, and reactive to light. Pupils are equal.      Right eye: Pupil is reactive.      Left eye: Pupil is reactive.      Funduscopic exam:     Right eye: Red reflex present.         Left eye: Red reflex present.     Comments: Bilateral conjunctival injection without discharge.  Pupils equal and reactive to light.  EOM intact.  No eyelid pain or swelling.  No discharge or matting to lash line   Cardiovascular:      Rate and Rhythm: Normal rate.      Pulses: Normal pulses.   Pulmonary:      Effort: Pulmonary effort is normal. No respiratory distress.   Musculoskeletal:         General: Normal range of motion.      Cervical back: Normal range of motion and neck supple.   Skin:     General: Skin is warm and dry.   Neurological:      Mental Status: He is alert and oriented to person, place, and time.         Assessment/Plan:     Diagnosis and Associated Orders:     1. Allergic conjunctivitis of both eyes  - olopatadine (PATANOL) 0.1 % ophthalmic solution; Administer 1 Drop into both eyes 2 times a day.  Dispense: 5 mL; Refill: 0  - naphazoline-pheniramine (NAPHCON-A) 0.025-0.3 % ophthalmic solution; Administer 1 Drop into both eyes 4 times a day. Do not use longer than 2 weeks  Dispense: 15 mL; Refill: 0        Comments/MDM:  Exam appears consistent with allergic conjunctivitis.  No significant discharge or indication for antibiotics based on exam and history.  Advised to notify me if symptoms worsen specifically developing any copious discharge.  Instructed patient and parent about the etiology and pathogenesis of allergic conjunctivits. Advised to avoid allergen  exposure, limit outdoor exposure, use air conditioning when at all possible, roll up the windows when possible, and avoid rubbing the eyes. Discussed medications if prescribed. May use OTC anti-histamine as well for relief (Zyrtec/Claritin), and/or Benadryl at night to assist with sleep. Return to clinic if symptoms persists/do not improve for possible referral to allergist.      - olopatadine HCl (PATADAY) 0.2 % ophthalmic solution; Administer 1 Drop into both eyes every day for 14 days.  Dispense: 2.5 mL; Refill: 0  - fluticasone (FLONASE) 50 MCG/ACT nasal spray; Administer 1 Spray into affected nostril(S) every day.  Dispense: 16 g; Refill: 0    I personally reviewed prior external notes and test results pertinent to today's visit. Supportive care, natural history, differential diagnoses, and indications for immediate follow-up discussed. Return to clinic or go to ED if symptoms worsen or persist.  Red flag symptoms discussed.  Patient/Parent/Guardian voices understanding. Follow-up with your primary care provider in 3-5 days.  All side effects of medication discussed including allergic response, GI upset, tendon injury, rash, sedation etc    Please note that this dictation was created using voice recognition software. I have made a reasonable attempt to correct obvious errors, but I expect that there are errors of grammar and possibly content that I did not discover before finalizing the note.    This note was electronically signed by Jennifer Elliott PA-C

## 2024-11-19 ENCOUNTER — OFFICE VISIT (OUTPATIENT)
Dept: URGENT CARE | Facility: PHYSICIAN GROUP | Age: 24
End: 2024-11-19
Payer: COMMERCIAL

## 2024-11-19 VITALS
TEMPERATURE: 98.4 F | BODY MASS INDEX: 21.08 KG/M2 | WEIGHT: 155.6 LBS | HEART RATE: 84 BPM | HEIGHT: 72 IN | RESPIRATION RATE: 14 BRPM | DIASTOLIC BLOOD PRESSURE: 60 MMHG | SYSTOLIC BLOOD PRESSURE: 100 MMHG | OXYGEN SATURATION: 95 %

## 2024-11-19 DIAGNOSIS — J03.90 EXUDATIVE TONSILLITIS: ICD-10-CM

## 2024-11-19 DIAGNOSIS — J40 BRONCHITIS: ICD-10-CM

## 2024-11-19 LAB — S PYO DNA SPEC NAA+PROBE: NOT DETECTED

## 2024-11-19 PROCEDURE — 3078F DIAST BP <80 MM HG: CPT | Performed by: STUDENT IN AN ORGANIZED HEALTH CARE EDUCATION/TRAINING PROGRAM

## 2024-11-19 PROCEDURE — 87651 STREP A DNA AMP PROBE: CPT | Performed by: STUDENT IN AN ORGANIZED HEALTH CARE EDUCATION/TRAINING PROGRAM

## 2024-11-19 PROCEDURE — 99214 OFFICE O/P EST MOD 30 MIN: CPT | Mod: 25 | Performed by: STUDENT IN AN ORGANIZED HEALTH CARE EDUCATION/TRAINING PROGRAM

## 2024-11-19 PROCEDURE — 94640 AIRWAY INHALATION TREATMENT: CPT | Performed by: STUDENT IN AN ORGANIZED HEALTH CARE EDUCATION/TRAINING PROGRAM

## 2024-11-19 PROCEDURE — 3074F SYST BP LT 130 MM HG: CPT | Performed by: STUDENT IN AN ORGANIZED HEALTH CARE EDUCATION/TRAINING PROGRAM

## 2024-11-19 RX ORDER — IPRATROPIUM BROMIDE AND ALBUTEROL SULFATE 2.5; .5 MG/3ML; MG/3ML
3 SOLUTION RESPIRATORY (INHALATION) ONCE
Status: COMPLETED | OUTPATIENT
Start: 2024-11-19 | End: 2024-11-19

## 2024-11-19 RX ORDER — DEXAMETHASONE SODIUM PHOSPHATE 10 MG/ML
10 INJECTION INTRAMUSCULAR; INTRAVENOUS ONCE
Status: COMPLETED | OUTPATIENT
Start: 2024-11-19 | End: 2024-11-19

## 2024-11-19 RX ORDER — INHALER, ASSIST DEVICES
1 SPACER (EA) MISCELLANEOUS ONCE
Qty: 1 EACH | Refills: 0 | Status: SHIPPED | OUTPATIENT
Start: 2024-11-19 | End: 2024-11-19

## 2024-11-19 RX ORDER — ALBUTEROL SULFATE 90 UG/1
1-2 INHALANT RESPIRATORY (INHALATION) EVERY 6 HOURS PRN
Qty: 8.5 G | Refills: 1 | Status: SHIPPED | OUTPATIENT
Start: 2024-11-19

## 2024-11-19 RX ADMIN — IPRATROPIUM BROMIDE AND ALBUTEROL SULFATE 3 ML: 2.5; .5 SOLUTION RESPIRATORY (INHALATION) at 10:55

## 2024-11-19 RX ADMIN — DEXAMETHASONE SODIUM PHOSPHATE 10 MG: 10 INJECTION INTRAMUSCULAR; INTRAVENOUS at 11:17

## 2024-11-19 NOTE — PROGRESS NOTES
Subjective:   CHIEF COMPLAINT  Chief Complaint   Patient presents with    Cough     X2 weeks, everytime Pt cough he gets thick fluid, sore throat.       HPI    History of Present Illness  Jac Marroquin is a 24 y.o. male who presents     He has been experiencing a cough for approximately 2 weeks, which is most severe in the morning upon waking. The cough persists throughout the day but is less severe than in the morning. He has noticed a thick, clear liquid being produced when he coughs, but no blood. Occasionally, he experiences a rattling sensation when breathing. He is experiencing shortness of breath which is exacerbated by coughing.    He has not taken any medication for this symptom.    He reports no history of asthma, but did use inhalers briefly following a pneumothorax. He does not typically experience heartburn or acid reflux, and his cough is not triggered by food. He reports no fevers, body aches, or chills in the past few days.  Symptoms do not feel like his previous pneumothorax.    PMH of spontaneous pneumothorax x2, last 2018, status post VATS     REVIEW OF SYSTEMS  General: no fever or chills  GI: no nausea or vomiting  See HPI for further details.    PAST MEDICAL HISTORY  Patient Active Problem List    Diagnosis Date Noted    Trauma 11/20/2017    Concussion 11/20/2017    Scalp laceration 11/20/2017    Periorbital hematoma of right eye 11/20/2017       SURGICAL HISTORY  patient denies any surgical history    ALLERGIES  No Known Allergies    CURRENT MEDICATIONS  Home Medications       Reviewed by Francis Gaytan D.O. (Physician) on 11/22/24 at 1526  Med List Status: <None>     Medication Last Dose Status   albuterol 108 (90 Base) MCG/ACT Aero Soln inhalation aerosol  Active   naphazoline-pheniramine (NAPHCON-A) 0.025-0.3 % ophthalmic solution Not Taking Active   olopatadine (PATANOL) 0.1 % ophthalmic solution Not Taking Active                    SOCIAL HISTORY  Social History     Tobacco Use     "Smoking status: Never    Smokeless tobacco: Current     Types: Chew   Vaping Use    Vaping status: Former    Substances: Nicotine    Devices: Disposable   Substance and Sexual Activity    Alcohol use: Yes     Comment: socially    Drug use: Not Currently     Types: Marijuana, Oral    Sexual activity: Not Currently       FAMILY HISTORY  History reviewed. No pertinent family history.       Objective:   PHYSICAL EXAM  VITAL SIGNS: /60 (BP Location: Right arm, Patient Position: Sitting, BP Cuff Size: Adult)   Pulse 84   Temp 36.9 °C (98.4 °F)   Resp 14   Ht 1.822 m (5' 11.75\")   Wt 70.6 kg (155 lb 9.6 oz)   SpO2 95%   BMI 21.25 kg/m²     Gen: no acute distress, normal voice  Skin: dry, intact, moist mucosal membranes  Eyes: No conjunctival injection b/l  Neck: Normal range of motion. No meningeal signs.   ENT: Mild posterior oropharyngeal erythema with left tonsillar exudate.  Uvula midline without edema the soft palate.  No lymphadenopathy.  Lungs: No increased work of breathing.  CTAB w/ symmetric expansion  CV: RRR w/o murmurs or clicks  Psych: normal affect, normal judgement, alert, awake    Assessment/Plan:     1. Bronchitis  ipratropium-albuterol (DUONEB) nebulizer solution    Spacer/Aero-Holding Chambers (AEROCHAMBER PLUS-FLOW SIGNAL) Misc    albuterol 108 (90 Base) MCG/ACT Aero Soln inhalation aerosol    dexamethasone (Decadron) injection (check route below) 10 mg      2. Exudative tonsillitis  POCT CEPHEID GROUP A STREP - PCR    dexamethasone (Decadron) injection (check route below) 10 mg      1) possible underlying RAD.  Patient was given a breathing treatment stated he felt slightly better.  Repeat exam he was able to take large breaths much more easily and lungs were CTAB.  No hypoxia, tachycardia or pleurisy.  -Decadron 10 mg p.o. x 1 given the clinic  -Ordered albuterol with spacer.  Proper technique reviewed    2) Decadron as described above.      Chronic, recurrent condition (recurrent cough, " history of pneumothorax), ordered prescription medications    Please note that this dictation was created using voice recognition software. I have made a reasonable attempt to correct obvious errors, but I expect that there are errors of grammar and possibly content that I did not discover before finalizing the note.

## 2025-01-02 ENCOUNTER — HOSPITAL ENCOUNTER (OUTPATIENT)
Dept: LAB | Facility: MEDICAL CENTER | Age: 25
End: 2025-01-02
Payer: COMMERCIAL

## 2025-01-02 ENCOUNTER — OFFICE VISIT (OUTPATIENT)
Dept: MEDICAL GROUP | Facility: PHYSICIAN GROUP | Age: 25
End: 2025-01-02
Payer: COMMERCIAL

## 2025-01-02 VITALS
HEART RATE: 81 BPM | SYSTOLIC BLOOD PRESSURE: 90 MMHG | HEIGHT: 71 IN | WEIGHT: 157 LBS | DIASTOLIC BLOOD PRESSURE: 60 MMHG | BODY MASS INDEX: 21.98 KG/M2 | TEMPERATURE: 98.5 F | OXYGEN SATURATION: 96 %

## 2025-01-02 DIAGNOSIS — M79.602 LEFT ARM PAIN: ICD-10-CM

## 2025-01-02 DIAGNOSIS — Z13.9 ENCOUNTER FOR SCREENING: ICD-10-CM

## 2025-01-02 DIAGNOSIS — Z23 ENCOUNTER FOR IMMUNIZATION: ICD-10-CM

## 2025-01-02 PROBLEM — Z87.09 HISTORY OF PNEUMOTHORAX: Status: ACTIVE | Noted: 2025-01-02

## 2025-01-02 PROCEDURE — 3078F DIAST BP <80 MM HG: CPT

## 2025-01-02 PROCEDURE — 86803 HEPATITIS C AB TEST: CPT

## 2025-01-02 PROCEDURE — 90656 IIV3 VACC NO PRSV 0.5 ML IM: CPT

## 2025-01-02 PROCEDURE — 90715 TDAP VACCINE 7 YRS/> IM: CPT

## 2025-01-02 PROCEDURE — 87389 HIV-1 AG W/HIV-1&-2 AB AG IA: CPT

## 2025-01-02 PROCEDURE — 90471 IMMUNIZATION ADMIN: CPT

## 2025-01-02 PROCEDURE — 90472 IMMUNIZATION ADMIN EACH ADD: CPT

## 2025-01-02 PROCEDURE — 3074F SYST BP LT 130 MM HG: CPT

## 2025-01-02 PROCEDURE — 36415 COLL VENOUS BLD VENIPUNCTURE: CPT

## 2025-01-02 PROCEDURE — 99203 OFFICE O/P NEW LOW 30 MIN: CPT | Mod: 25

## 2025-01-02 SDOH — HEALTH STABILITY: PHYSICAL HEALTH: ON AVERAGE, HOW MANY DAYS PER WEEK DO YOU ENGAGE IN MODERATE TO STRENUOUS EXERCISE (LIKE A BRISK WALK)?: 5 DAYS

## 2025-01-02 SDOH — ECONOMIC STABILITY: FOOD INSECURITY: WITHIN THE PAST 12 MONTHS, THE FOOD YOU BOUGHT JUST DIDN'T LAST AND YOU DIDN'T HAVE MONEY TO GET MORE.: NEVER TRUE

## 2025-01-02 SDOH — ECONOMIC STABILITY: INCOME INSECURITY: IN THE LAST 12 MONTHS, WAS THERE A TIME WHEN YOU WERE NOT ABLE TO PAY THE MORTGAGE OR RENT ON TIME?: NO

## 2025-01-02 SDOH — ECONOMIC STABILITY: TRANSPORTATION INSECURITY
IN THE PAST 12 MONTHS, HAS THE LACK OF TRANSPORTATION KEPT YOU FROM MEDICAL APPOINTMENTS OR FROM GETTING MEDICATIONS?: NO

## 2025-01-02 SDOH — ECONOMIC STABILITY: FOOD INSECURITY: WITHIN THE PAST 12 MONTHS, YOU WORRIED THAT YOUR FOOD WOULD RUN OUT BEFORE YOU GOT MONEY TO BUY MORE.: NEVER TRUE

## 2025-01-02 SDOH — ECONOMIC STABILITY: HOUSING INSECURITY
IN THE LAST 12 MONTHS, WAS THERE A TIME WHEN YOU DID NOT HAVE A STEADY PLACE TO SLEEP OR SLEPT IN A SHELTER (INCLUDING NOW)?: NO

## 2025-01-02 SDOH — HEALTH STABILITY: PHYSICAL HEALTH: ON AVERAGE, HOW MANY MINUTES DO YOU ENGAGE IN EXERCISE AT THIS LEVEL?: 30 MIN

## 2025-01-02 SDOH — ECONOMIC STABILITY: TRANSPORTATION INSECURITY
IN THE PAST 12 MONTHS, HAS LACK OF TRANSPORTATION KEPT YOU FROM MEETINGS, WORK, OR FROM GETTING THINGS NEEDED FOR DAILY LIVING?: NO

## 2025-01-02 SDOH — ECONOMIC STABILITY: INCOME INSECURITY: HOW HARD IS IT FOR YOU TO PAY FOR THE VERY BASICS LIKE FOOD, HOUSING, MEDICAL CARE, AND HEATING?: NOT VERY HARD

## 2025-01-02 SDOH — HEALTH STABILITY: MENTAL HEALTH
STRESS IS WHEN SOMEONE FEELS TENSE, NERVOUS, ANXIOUS, OR CAN'T SLEEP AT NIGHT BECAUSE THEIR MIND IS TROUBLED. HOW STRESSED ARE YOU?: VERY MUCH

## 2025-01-02 SDOH — ECONOMIC STABILITY: TRANSPORTATION INSECURITY
IN THE PAST 12 MONTHS, HAS LACK OF RELIABLE TRANSPORTATION KEPT YOU FROM MEDICAL APPOINTMENTS, MEETINGS, WORK OR FROM GETTING THINGS NEEDED FOR DAILY LIVING?: NO

## 2025-01-02 ASSESSMENT — SOCIAL DETERMINANTS OF HEALTH (SDOH)
ARE YOU MARRIED, WIDOWED, DIVORCED, SEPARATED, NEVER MARRIED, OR LIVING WITH A PARTNER?: NEVER MARRIED
HOW OFTEN DO YOU GET TOGETHER WITH FRIENDS OR RELATIVES?: ONCE A WEEK
HOW OFTEN DO YOU ATTENT MEETINGS OF THE CLUB OR ORGANIZATION YOU BELONG TO?: NEVER
HOW OFTEN DO YOU ATTENT MEETINGS OF THE CLUB OR ORGANIZATION YOU BELONG TO?: NEVER
HOW OFTEN DO YOU ATTEND CHURCH OR RELIGIOUS SERVICES?: NEVER
DO YOU BELONG TO ANY CLUBS OR ORGANIZATIONS SUCH AS CHURCH GROUPS UNIONS, FRATERNAL OR ATHLETIC GROUPS, OR SCHOOL GROUPS?: NO
DO YOU BELONG TO ANY CLUBS OR ORGANIZATIONS SUCH AS CHURCH GROUPS UNIONS, FRATERNAL OR ATHLETIC GROUPS, OR SCHOOL GROUPS?: NO
HOW OFTEN DO YOU ATTEND CHURCH OR RELIGIOUS SERVICES?: NEVER
IN A TYPICAL WEEK, HOW MANY TIMES DO YOU TALK ON THE PHONE WITH FAMILY, FRIENDS, OR NEIGHBORS?: TWICE A WEEK
IN A TYPICAL WEEK, HOW MANY TIMES DO YOU TALK ON THE PHONE WITH FAMILY, FRIENDS, OR NEIGHBORS?: TWICE A WEEK
HOW OFTEN DO YOU GET TOGETHER WITH FRIENDS OR RELATIVES?: ONCE A WEEK
HOW OFTEN DO YOU HAVE A DRINK CONTAINING ALCOHOL: 2-4 TIMES A MONTH
IN THE PAST 12 MONTHS, HAS THE ELECTRIC, GAS, OIL, OR WATER COMPANY THREATENED TO SHUT OFF SERVICE IN YOUR HOME?: NO
HOW MANY DRINKS CONTAINING ALCOHOL DO YOU HAVE ON A TYPICAL DAY WHEN YOU ARE DRINKING: 3 OR 4
HOW OFTEN DO YOU HAVE SIX OR MORE DRINKS ON ONE OCCASION: MONTHLY
HOW HARD IS IT FOR YOU TO PAY FOR THE VERY BASICS LIKE FOOD, HOUSING, MEDICAL CARE, AND HEATING?: NOT VERY HARD
ARE YOU MARRIED, WIDOWED, DIVORCED, SEPARATED, NEVER MARRIED, OR LIVING WITH A PARTNER?: NEVER MARRIED
WITHIN THE PAST 12 MONTHS, YOU WORRIED THAT YOUR FOOD WOULD RUN OUT BEFORE YOU GOT THE MONEY TO BUY MORE: NEVER TRUE

## 2025-01-02 ASSESSMENT — ENCOUNTER SYMPTOMS
DEPRESSION: 0
COUGH: 0
HEADACHES: 0
DIARRHEA: 0
HEARTBURN: 0
INSOMNIA: 0
BACK PAIN: 0
CONSTIPATION: 0
WEIGHT LOSS: 0

## 2025-01-02 ASSESSMENT — LIFESTYLE VARIABLES
AUDIT-C TOTAL SCORE: 5
HOW OFTEN DO YOU HAVE SIX OR MORE DRINKS ON ONE OCCASION: MONTHLY
HOW OFTEN DO YOU HAVE A DRINK CONTAINING ALCOHOL: 2-4 TIMES A MONTH
SKIP TO QUESTIONS 9-10: 0
HOW MANY STANDARD DRINKS CONTAINING ALCOHOL DO YOU HAVE ON A TYPICAL DAY: 3 OR 4

## 2025-01-02 ASSESSMENT — PATIENT HEALTH QUESTIONNAIRE - PHQ9
SUM OF ALL RESPONSES TO PHQ QUESTIONS 1-9: 8
5. POOR APPETITE OR OVEREATING: 1 - SEVERAL DAYS
CLINICAL INTERPRETATION OF PHQ2 SCORE: 2

## 2025-01-02 NOTE — PROGRESS NOTES
"Subjective:     CC:  Diagnoses of Left arm pain, Encounter for immunization, and Encounter for screening were pertinent to this visit.    HISTORY OF THE PRESENT ILLNESS: Patient is a 24 y.o. male. This pleasant patient is here today to establish care and discuss past medical history as well as acute left arm pain.    Problem   Left Arm Pain    Started 2 days ago, noticed pain when he woke up. Has not had anything like this before. Hurts with movement and certain positions are alleviating.      History of Pneumothorax    Right sided, had surgery to correct. Occurred 8/2018.         Health Maintenance: Completed    ROS:   Review of Systems   Constitutional:  Negative for weight loss.   Respiratory:  Negative for cough.    Cardiovascular:  Negative for leg swelling.   Gastrointestinal:  Negative for constipation, diarrhea and heartburn.   Genitourinary:  Negative for dysuria, frequency and urgency.   Musculoskeletal:  Positive for joint pain. Negative for back pain.   Neurological:  Negative for headaches.   Psychiatric/Behavioral:  Negative for depression. The patient does not have insomnia.          Objective:       Exam: BP 90/60 (BP Location: Right arm, Patient Position: Sitting, BP Cuff Size: Adult)   Pulse 81   Temp 36.9 °C (98.5 °F) (Temporal)   Ht 1.803 m (5' 10.98\")   Wt 71.2 kg (157 lb)   SpO2 96%  Body mass index is 21.91 kg/m².    Physical Exam  Constitutional:       Appearance: Normal appearance.   HENT:      Right Ear: Tympanic membrane and ear canal normal.      Left Ear: Tympanic membrane and ear canal normal.      Mouth/Throat:      Mouth: Mucous membranes are moist.      Pharynx: Oropharynx is clear.   Eyes:      Conjunctiva/sclera: Conjunctivae normal.      Pupils: Pupils are equal, round, and reactive to light.   Cardiovascular:      Rate and Rhythm: Normal rate and regular rhythm.   Pulmonary:      Effort: No respiratory distress.      Breath sounds: No wheezing.   Abdominal:      General: " There is no distension.      Tenderness: There is no abdominal tenderness. There is no right CVA tenderness or left CVA tenderness.   Musculoskeletal:         General: No swelling.      Right shoulder: Normal.      Left shoulder: Normal.      Right elbow: Normal.      Left elbow: Swelling present. No deformity, effusion or lacerations. Decreased range of motion. Tenderness present in medial epicondyle, lateral epicondyle and olecranon process. No radial head tenderness.      Right wrist: Normal.      Left wrist: Normal.      Cervical back: Normal range of motion.      Right lower leg: No edema.      Left lower leg: No edema.   Skin:     General: Skin is warm and dry.      Capillary Refill: Capillary refill takes less than 2 seconds.      Findings: No rash.   Neurological:      General: No focal deficit present.      Mental Status: He is alert and oriented to person, place, and time.      Deep Tendon Reflexes: Reflexes normal.   Psychiatric:         Mood and Affect: Mood normal.             Assessment & Plan:   24 y.o. male with the following -    Assessment & Plan  Left arm pain  -Acute, duration 2 days has not occurred before, no trauma noted although swelling was present this morning much more than it is in clinic currently  -Advised patient to employ conservative treatment including rest, ice twice daily, ibuprofen and other NSAIDs  -If no improvement in a week then call for reassessment  -X-ray of the elbow ordered today, will relay results and possible recommendations to patient when available  Orders:    DX-ELBOW-COMPLETE 3+ LEFT; Future    Encounter for immunization  -Relevant immunizations reviewed with the patient  -Flu vaccine and Tdap ordered and administered in clinic today  Orders:    Tdap Vaccine =>6YO IM    INFLUENZA VACCINE TRI INJ (PF)     Encounter for screening  -Relevant screenings discussed with the patient  -HIV and hepatitis C screenings ordered today  Orders:    HIV AG/AB COMBO ASSAY  SCREENING; Future    HEP C VIRUS ANTIBODY; Future      Return in about 1 year (around 1/2/2026) for f/u.    Please note that this dictation was created using voice recognition software. I have made every reasonable attempt to correct obvious errors, but I expect that there are errors of grammar and possibly content that I did not discover before finalizing the note.        Tan Metz D.O.

## 2025-01-02 NOTE — ASSESSMENT & PLAN NOTE
-Acute, duration 2 days has not occurred before, no trauma noted although swelling was present this morning much more than it is in clinic currently  -Advised patient to employ conservative treatment including rest, ice twice daily, ibuprofen and other NSAIDs  -If no improvement in a week then call for reassessment  -X-ray of the elbow ordered today, will relay results and possible recommendations to patient when available  Orders:    DX-ELBOW-COMPLETE 3+ LEFT; Future

## 2025-01-03 ENCOUNTER — APPOINTMENT (OUTPATIENT)
Dept: RADIOLOGY | Facility: MEDICAL CENTER | Age: 25
End: 2025-01-03
Payer: COMMERCIAL

## 2025-01-03 DIAGNOSIS — M79.602 LEFT ARM PAIN: ICD-10-CM

## 2025-01-03 LAB
HCV AB SER QL: NORMAL
HIV 1+2 AB+HIV1 P24 AG SERPL QL IA: NORMAL

## 2025-01-03 PROCEDURE — 73080 X-RAY EXAM OF ELBOW: CPT | Mod: LT

## 2025-03-26 ENCOUNTER — OFFICE VISIT (OUTPATIENT)
Dept: URGENT CARE | Facility: PHYSICIAN GROUP | Age: 25
End: 2025-03-26
Payer: COMMERCIAL

## 2025-03-26 ENCOUNTER — HOSPITAL ENCOUNTER (OUTPATIENT)
Dept: RADIOLOGY | Facility: MEDICAL CENTER | Age: 25
End: 2025-03-26
Attending: PHYSICIAN ASSISTANT
Payer: COMMERCIAL

## 2025-03-26 VITALS
HEART RATE: 72 BPM | DIASTOLIC BLOOD PRESSURE: 64 MMHG | WEIGHT: 160 LBS | SYSTOLIC BLOOD PRESSURE: 108 MMHG | OXYGEN SATURATION: 97 % | HEIGHT: 71 IN | TEMPERATURE: 98.3 F | BODY MASS INDEX: 22.4 KG/M2 | RESPIRATION RATE: 18 BRPM

## 2025-03-26 DIAGNOSIS — R05.3 CHRONIC COUGH: ICD-10-CM

## 2025-03-26 DIAGNOSIS — Z87.09 HISTORY OF REACTIVE AIRWAY DISEASE: ICD-10-CM

## 2025-03-26 DIAGNOSIS — R06.2 WHEEZING: ICD-10-CM

## 2025-03-26 PROCEDURE — 71046 X-RAY EXAM CHEST 2 VIEWS: CPT

## 2025-03-26 PROCEDURE — 3074F SYST BP LT 130 MM HG: CPT | Performed by: PHYSICIAN ASSISTANT

## 2025-03-26 PROCEDURE — 99214 OFFICE O/P EST MOD 30 MIN: CPT | Performed by: PHYSICIAN ASSISTANT

## 2025-03-26 PROCEDURE — 3078F DIAST BP <80 MM HG: CPT | Performed by: PHYSICIAN ASSISTANT

## 2025-03-26 RX ORDER — ALBUTEROL SULFATE 90 UG/1
2 INHALANT RESPIRATORY (INHALATION) EVERY 6 HOURS PRN
Qty: 8.5 G | Refills: 0 | Status: SHIPPED | OUTPATIENT
Start: 2025-03-26

## 2025-03-26 RX ORDER — PREDNISONE 20 MG/1
TABLET ORAL
Qty: 10 TABLET | Refills: 0 | Status: SHIPPED | OUTPATIENT
Start: 2025-03-26

## 2025-03-26 ASSESSMENT — ENCOUNTER SYMPTOMS
CHILLS: 0
WHEEZING: 1
EYE DISCHARGE: 0
HEADACHES: 0
SORE THROAT: 1
NECK PAIN: 0
SPUTUM PRODUCTION: 1
MYALGIAS: 0
FEVER: 0
COUGH: 1
DIZZINESS: 0
DIARRHEA: 0
SHORTNESS OF BREATH: 0
VOMITING: 0
EYE REDNESS: 0

## 2025-03-26 NOTE — PROGRESS NOTES
"Subjective     Jac Marroquin is a 24 y.o. male who presents with Cough (Flu sx x 2 weeks)            Patient is a 24-year-old male presents to urgent care with persistent cough since January.  Patient reports he has been intermittently utilizing his rescue inhaler as he does report chest tightness with wheezing which has improved with his inhaler.  Patient then reports over the last week he has developed slight congestion, sore throat and worsening cough that is now more productive in nature.  He does describe a posttussive emesis last night further prompting evaluation today.  Patient does report history of spontaneous pneumothorax after that he is to utilize rescue inhalers no clear history of asthma.  Denies any fevers or bodyaches but does report associated fatigue.  Currently denies any shortness of breath or chest pain or discomfort.    Cough  Associated symptoms include a sore throat and wheezing. Pertinent negatives include no chest pain, chills, ear pain, eye redness, fever, headaches, myalgias, rash or shortness of breath.       Review of Systems   Constitutional:  Positive for malaise/fatigue. Negative for chills and fever.   HENT:  Positive for congestion and sore throat. Negative for ear discharge and ear pain.    Eyes:  Negative for discharge and redness.   Respiratory:  Positive for cough, sputum production and wheezing. Negative for shortness of breath.    Cardiovascular:  Negative for chest pain and leg swelling.   Gastrointestinal:  Negative for diarrhea and vomiting.   Genitourinary:  Negative for dysuria and urgency.   Musculoskeletal:  Negative for myalgias and neck pain.   Skin:  Negative for itching and rash.   Neurological:  Negative for dizziness and headaches.              Objective     /64   Pulse 72   Temp 36.8 °C (98.3 °F)   Resp 18   Ht 1.803 m (5' 11\")   Wt 72.6 kg (160 lb)   SpO2 97%   BMI 22.32 kg/m²    PMH:  has no past medical history on file.  MEDS: Reviewed . "   ALLERGIES: No Known Allergies  SURGHX: History reviewed. No pertinent surgical history.  SOCHX:  reports that he has never smoked. His smokeless tobacco use includes chew. He reports current alcohol use. He reports that he does not currently use drugs after having used the following drugs: Marijuana and Oral.  FH: Family history was reviewed, no pertinent findings to report    Physical Exam  Vitals reviewed.   Constitutional:       Appearance: Normal appearance. He is well-developed.   HENT:      Head: Normocephalic and atraumatic.      Ears:      Comments: Bilateral clear middle ear effusions.     Nose:      Comments: Rhinorrhea noted.     Mouth/Throat:      Comments: Posterior oropharynx is erythematous, positive postnasal drainage.  No evidence of exudate.  Eyes:      Conjunctiva/sclera: Conjunctivae normal.      Pupils: Pupils are equal, round, and reactive to light.   Cardiovascular:      Rate and Rhythm: Normal rate and regular rhythm.      Heart sounds: No murmur heard.  Pulmonary:      Effort: Pulmonary effort is normal. No respiratory distress.      Breath sounds: Wheezing present.      Comments: Minimal upper lung field expiratory wheezing.  Musculoskeletal:         General: Normal range of motion.      Cervical back: Normal range of motion and neck supple.      Right lower leg: No edema.      Left lower leg: No edema.   Lymphadenopathy:      Cervical: No cervical adenopathy.   Skin:     General: Skin is warm.      Findings: No rash.   Neurological:      Mental Status: He is alert and oriented to person, place, and time.   Psychiatric:         Mood and Affect: Mood normal.         Behavior: Behavior normal.         Thought Content: Thought content normal.                                  Assessment & Plan  Chronic cough    Orders:    DX-CHEST-2 VIEWS; Future    History of reactive airway disease    Orders:    DX-CHEST-2 VIEWS; Future    Wheezing    Orders:    albuterol 108 (90 Base) MCG/ACT Aero Soln  inhalation aerosol; Inhale 2 Puffs every 6 hours as needed for Shortness of Breath.    predniSONE (DELTASONE) 20 MG Tab; Take 2 tabs po daily for 5 days.             It was explained today that due to the viral/reactive nature of the pt's illness, we will treat symptomatically today.  Chest x-ray is clear without evidence of consolidation or further acute cardiopulmonary changes.  I reviewed the x-ray and agree with the official read.  Discussed results with the patient today.  Due to duration of symptoms viral testing was deferred at this time.  Will reinitiate albuterol at this time and will start patient on prednisone for the next 5 days.  Also encourage patient to begin over-the-counter antihistamine to assist with allergic trigger-component.  No evidence of bacterial infection on exam. Lungs are clear- other than minimal wheeze, Sats are WNL, TMs clear, neck is supple, pt. Is well appearing- non-toxic.   Encouraged OTC supportive meds PRN. Humidification, increase fluids, avoid night time dairy.   Patient is very agreeable at this time and understands the plan.  Discussed side effects of OTC meds and any prescribed.  Given precautionary s/sx that mandate immediate follow up and evaluation in the ED. Advised of risks of not doing so.    DDX, Supportive care, and indications for immediate follow-up discussed with patient.    Instructed to return to clinic or nearest emergency department if we are not available for any change in condition, further concerns, or worsening of symptoms.    The patient  and/or guardian demonstrated a good understanding and agreed with the treatment plan.    Please note that this dictation was created using voice recognition software. I have made every reasonable attempt to correct obvious errors, but I expect that there are errors of grammar and possibly content that I did not discover before finalizing the note.